# Patient Record
Sex: MALE | Race: WHITE | NOT HISPANIC OR LATINO | URBAN - METROPOLITAN AREA
[De-identification: names, ages, dates, MRNs, and addresses within clinical notes are randomized per-mention and may not be internally consistent; named-entity substitution may affect disease eponyms.]

---

## 2017-05-15 ENCOUNTER — EMERGENCY (EMERGENCY)
Facility: HOSPITAL | Age: 22
LOS: 1 days | Discharge: HOME | End: 2017-05-15
Attending: EMERGENCY MEDICINE
Payer: MEDICAID

## 2017-05-15 VITALS
TEMPERATURE: 98 F | HEIGHT: 68 IN | SYSTOLIC BLOOD PRESSURE: 132 MMHG | OXYGEN SATURATION: 98 % | HEART RATE: 72 BPM | RESPIRATION RATE: 20 BRPM | DIASTOLIC BLOOD PRESSURE: 70 MMHG | WEIGHT: 164.91 LBS

## 2017-05-15 VITALS
HEART RATE: 68 BPM | OXYGEN SATURATION: 98 % | DIASTOLIC BLOOD PRESSURE: 74 MMHG | RESPIRATION RATE: 20 BRPM | SYSTOLIC BLOOD PRESSURE: 122 MMHG

## 2017-05-15 DIAGNOSIS — R07.89 OTHER CHEST PAIN: ICD-10-CM

## 2017-05-15 DIAGNOSIS — R10.11 RIGHT UPPER QUADRANT PAIN: ICD-10-CM

## 2017-05-15 LAB
ALBUMIN SERPL ELPH-MCNC: 4.6 G/DL — SIGNIFICANT CHANGE UP (ref 3.3–5.2)
ALP SERPL-CCNC: 83 U/L — SIGNIFICANT CHANGE UP (ref 40–120)
ALT FLD-CCNC: 7 U/L — SIGNIFICANT CHANGE UP
ANION GAP SERPL CALC-SCNC: 14 MMOL/L — SIGNIFICANT CHANGE UP (ref 5–17)
AST SERPL-CCNC: 18 U/L — SIGNIFICANT CHANGE UP
BASOPHILS # BLD AUTO: 0 K/UL — SIGNIFICANT CHANGE UP (ref 0–0.2)
BASOPHILS NFR BLD AUTO: 0.2 % — SIGNIFICANT CHANGE UP (ref 0–2)
BILIRUB SERPL-MCNC: 0.5 MG/DL — SIGNIFICANT CHANGE UP (ref 0.4–2)
BUN SERPL-MCNC: 14 MG/DL — SIGNIFICANT CHANGE UP (ref 8–20)
CALCIUM SERPL-MCNC: 9.8 MG/DL — SIGNIFICANT CHANGE UP (ref 8.6–10.2)
CHLORIDE SERPL-SCNC: 100 MMOL/L — SIGNIFICANT CHANGE UP (ref 98–107)
CO2 SERPL-SCNC: 26 MMOL/L — SIGNIFICANT CHANGE UP (ref 22–29)
CREAT SERPL-MCNC: 1.01 MG/DL — SIGNIFICANT CHANGE UP (ref 0.5–1.3)
EOSINOPHIL # BLD AUTO: 0.3 K/UL — SIGNIFICANT CHANGE UP (ref 0–0.5)
EOSINOPHIL NFR BLD AUTO: 3.3 % — SIGNIFICANT CHANGE UP (ref 0–5)
GLUCOSE SERPL-MCNC: 98 MG/DL — SIGNIFICANT CHANGE UP (ref 70–115)
HCT VFR BLD CALC: 49.3 % — SIGNIFICANT CHANGE UP (ref 42–52)
HGB BLD-MCNC: 16.7 G/DL — SIGNIFICANT CHANGE UP (ref 14–18)
LIDOCAIN IGE QN: 41 U/L — SIGNIFICANT CHANGE UP (ref 22–51)
LYMPHOCYTES # BLD AUTO: 2.1 K/UL — SIGNIFICANT CHANGE UP (ref 1–4.8)
LYMPHOCYTES # BLD AUTO: 24.8 % — SIGNIFICANT CHANGE UP (ref 20–55)
MCHC RBC-ENTMCNC: 29.3 PG — SIGNIFICANT CHANGE UP (ref 27–31)
MCHC RBC-ENTMCNC: 33.9 G/DL — SIGNIFICANT CHANGE UP (ref 32–36)
MCV RBC AUTO: 86.6 FL — SIGNIFICANT CHANGE UP (ref 80–94)
MONOCYTES # BLD AUTO: 0.4 K/UL — SIGNIFICANT CHANGE UP (ref 0–0.8)
MONOCYTES NFR BLD AUTO: 5 % — SIGNIFICANT CHANGE UP (ref 3–10)
NEUTROPHILS # BLD AUTO: 5.6 K/UL — SIGNIFICANT CHANGE UP (ref 1.8–8)
NEUTROPHILS NFR BLD AUTO: 66.6 % — SIGNIFICANT CHANGE UP (ref 37–73)
PLATELET # BLD AUTO: 197 K/UL — SIGNIFICANT CHANGE UP (ref 150–400)
POTASSIUM SERPL-MCNC: 4.2 MMOL/L — SIGNIFICANT CHANGE UP (ref 3.5–5.3)
POTASSIUM SERPL-SCNC: 4.2 MMOL/L — SIGNIFICANT CHANGE UP (ref 3.5–5.3)
PROT SERPL-MCNC: 7.6 G/DL — SIGNIFICANT CHANGE UP (ref 6.6–8.7)
RBC # BLD: 5.69 M/UL — SIGNIFICANT CHANGE UP (ref 4.6–6.2)
RBC # FLD: 13.3 % — SIGNIFICANT CHANGE UP (ref 11–15.6)
SODIUM SERPL-SCNC: 140 MMOL/L — SIGNIFICANT CHANGE UP (ref 135–145)
WBC # BLD: 8.4 K/UL — SIGNIFICANT CHANGE UP (ref 4.8–10.8)
WBC # FLD AUTO: 8.4 K/UL — SIGNIFICANT CHANGE UP (ref 4.8–10.8)

## 2017-05-15 PROCEDURE — 99284 EMERGENCY DEPT VISIT MOD MDM: CPT

## 2017-05-15 PROCEDURE — 83690 ASSAY OF LIPASE: CPT

## 2017-05-15 PROCEDURE — 85027 COMPLETE CBC AUTOMATED: CPT

## 2017-05-15 PROCEDURE — 80053 COMPREHEN METABOLIC PANEL: CPT

## 2017-05-15 PROCEDURE — 76705 ECHO EXAM OF ABDOMEN: CPT

## 2017-05-15 PROCEDURE — 76705 ECHO EXAM OF ABDOMEN: CPT | Mod: 26

## 2017-05-15 PROCEDURE — 99284 EMERGENCY DEPT VISIT MOD MDM: CPT | Mod: 25

## 2017-05-15 NOTE — ED STATDOCS - NS ED MD SCRIBE ATTENDING SCRIBE SECTIONS
PHYSICAL EXAM/HISTORY OF PRESENT ILLNESS/VITAL SIGNS( Pullset)/PAST MEDICAL/SURGICAL/SOCIAL HISTORY/DISPOSITION/HIV/REVIEW OF SYSTEMS

## 2017-05-15 NOTE — ED STATDOCS - ATTENDING CONTRIBUTION TO CARE
I, Marcelino Camargo, performed the initial face to face bedside interview with this patient regarding history of present illness, review of symptoms and relevant past medical, social and family history.  I completed an independent physical examination.  I was the initial provider who evaluated this patient. I have signed out the follow up of any pending tests (i.e. labs, radiological studies) to the ACP.  I have communicated the patient’s plan of care and disposition with the ACP.  The history, relevant review of systems, past medical and surgical history, medical decision making, and physical examination was documented by the scribe in my presence and I attest to the accuracy of the documentation.

## 2017-05-15 NOTE — ED ADULT NURSE REASSESSMENT NOTE - NS ED NURSE REASSESS COMMENT FT1
Patient called to intake room 2 at 1900 and states that he does not want to be seen in ED because he no longer wants to wait.  Patient is awake and alert x3.  Patient made aware the risk of leaving including possible permanent disability or even death. Patient called to intake room 2 at 1900 and states that he does not want to be seen in ED because he no longer wants to wait.  Patient is awake and alert x3.  Patient made aware the risk of leaving including possible permanent disability or even death.  Patient still in ED at 1918 and states he will stay in ED and be seen.
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
## 2017-05-15 NOTE — ED ADULT TRIAGE NOTE - CHIEF COMPLAINT QUOTE
Patient arrived to ED today with c/o chest pressure, SOB, and abdominal pain.  Patient denies medical history.

## 2017-11-15 ENCOUNTER — EMERGENCY (EMERGENCY)
Facility: HOSPITAL | Age: 22
LOS: 1 days | Discharge: HOME | End: 2017-11-15
Attending: EMERGENCY MEDICINE
Payer: MEDICAID

## 2017-11-15 VITALS
HEIGHT: 69 IN | OXYGEN SATURATION: 100 % | RESPIRATION RATE: 20 BRPM | DIASTOLIC BLOOD PRESSURE: 80 MMHG | HEART RATE: 80 BPM | SYSTOLIC BLOOD PRESSURE: 133 MMHG | TEMPERATURE: 98 F | WEIGHT: 164.91 LBS

## 2017-11-15 LAB
APPEARANCE UR: CLEAR — SIGNIFICANT CHANGE UP
BACTERIA # UR AUTO: ABNORMAL
BILIRUB UR-MCNC: NEGATIVE — SIGNIFICANT CHANGE UP
COLOR SPEC: YELLOW — SIGNIFICANT CHANGE UP
DIFF PNL FLD: NEGATIVE — SIGNIFICANT CHANGE UP
EPI CELLS # UR: SIGNIFICANT CHANGE UP
GLUCOSE UR QL: NEGATIVE MG/DL — SIGNIFICANT CHANGE UP
KETONES UR-MCNC: NEGATIVE — SIGNIFICANT CHANGE UP
LEUKOCYTE ESTERASE UR-ACNC: ABNORMAL
NITRITE UR-MCNC: NEGATIVE — SIGNIFICANT CHANGE UP
PH UR: 6 — SIGNIFICANT CHANGE UP (ref 5–8)
PROT UR-MCNC: 15 MG/DL
RBC CASTS # UR COMP ASSIST: SIGNIFICANT CHANGE UP /HPF (ref 0–4)
SP GR SPEC: 1.02 — SIGNIFICANT CHANGE UP (ref 1.01–1.02)
UROBILINOGEN FLD QL: NEGATIVE MG/DL — SIGNIFICANT CHANGE UP
WBC UR QL: SIGNIFICANT CHANGE UP

## 2017-11-15 PROCEDURE — 96372 THER/PROPH/DIAG INJ SC/IM: CPT

## 2017-11-15 PROCEDURE — 99283 EMERGENCY DEPT VISIT LOW MDM: CPT | Mod: 25

## 2017-11-15 PROCEDURE — 81001 URINALYSIS AUTO W/SCOPE: CPT

## 2017-11-15 PROCEDURE — 99284 EMERGENCY DEPT VISIT MOD MDM: CPT

## 2017-11-15 RX ORDER — CEFTRIAXONE 500 MG/1
250 INJECTION, POWDER, FOR SOLUTION INTRAMUSCULAR; INTRAVENOUS ONCE
Qty: 0 | Refills: 0 | Status: COMPLETED | OUTPATIENT
Start: 2017-11-15 | End: 2017-11-15

## 2017-11-15 RX ORDER — AZITHROMYCIN 500 MG/1
1000 TABLET, FILM COATED ORAL ONCE
Qty: 0 | Refills: 0 | Status: COMPLETED | OUTPATIENT
Start: 2017-11-15 | End: 2017-11-15

## 2017-11-15 RX ADMIN — CEFTRIAXONE 250 MILLIGRAM(S): 500 INJECTION, POWDER, FOR SOLUTION INTRAMUSCULAR; INTRAVENOUS at 23:30

## 2017-11-15 RX ADMIN — AZITHROMYCIN 1000 MILLIGRAM(S): 500 TABLET, FILM COATED ORAL at 23:30

## 2017-11-15 NOTE — ED STATDOCS - OBJECTIVE STATEMENT
23 y/o M pt with no pertinent PMHx presents to the ED c/o lower abdominal pain, groin pain and burning on urination with yellow penile discharge that onset 3 days ago. Currently sexually active, unprotected sex with one partner. Has had chlamydia in the past and he was treated successfully. Non smoker, non drinker, no illicit drug use. No SHx. Denies testicular pain, rash, testicular swelling, fever, chills, recent  heavy lifting, sick contacts, recent travel, chest pain, SOB, n/v/d/c, abdominal pain, SOB, cough/cold symptoms or any other complaints. NKDA.

## 2017-11-15 NOTE — ED STATDOCS - MEDICAL DECISION MAKING DETAILS
most likely gc/chlamydia given penile discharge vs uti will fu UA tx for gc/chlamydia--dc most likely gc/chlamydia given penile discharge vs uti will fu UA tx for gc/chlamydia; unlikely appy or other abdominal pathology --abd bening no hernia on exam--dc

## 2017-11-15 NOTE — ED STATDOCS - ATTENDING CONTRIBUTION TO CARE
I, Janell Duong, performed the initial face to face bedside interview with this patient regarding history of present illness, review of symptoms and relevant past medical, social and family history.  I completed an independent physical examination.  I was the initial provider who evaluated this patient. I have signed out the follow up of any pending tests (i.e. labs, radiological studies) to the ACP.  I have communicated the patient’s plan of care and disposition with the ACP.

## 2017-11-16 ENCOUNTER — EMERGENCY (EMERGENCY)
Facility: HOSPITAL | Age: 22
LOS: 1 days | Discharge: HOME | End: 2017-11-16
Attending: EMERGENCY MEDICINE
Payer: MEDICAID

## 2017-11-16 VITALS
WEIGHT: 164.91 LBS | RESPIRATION RATE: 18 BRPM | HEIGHT: 69 IN | TEMPERATURE: 98 F | OXYGEN SATURATION: 100 % | DIASTOLIC BLOOD PRESSURE: 72 MMHG | HEART RATE: 70 BPM | SYSTOLIC BLOOD PRESSURE: 121 MMHG

## 2017-11-16 PROCEDURE — 99283 EMERGENCY DEPT VISIT LOW MDM: CPT

## 2017-11-16 RX ADMIN — Medication 100 MILLIGRAM(S): at 22:02

## 2017-11-16 NOTE — ED STATDOCS - OBJECTIVE STATEMENT
23 y/o M pt with no PMHx presents to the ED c/o abdominal pain x3 days. Explains that he came to the ED for the same issue 1 day ago and was treated with shots and pills. Reports unprotected sex with women, last sexual encounter a few weeks ago. Now complaining of pressure in his groin. Denies n/v/d/c, pain in rectum, pain when passing stool, chest pain, sob, fever, chills, blurred vision, pain when ejaculating, testicular pain, hematuria or any other complaints. NKDA. No SHx, No recent travel, sick contacts. 23 y/o M pt with no PMHx presents to the ED c/o abdominal pain x3 days. Describes pain as in and around groin area. Explains that he came to the ED for the same issue 1 day ago and was treated with shots and pills. Reports unprotected sex with women, last sexual encounter a few weeks ago. Now complaining of pressure in his groin. Denies n/v/d/c, pain in rectum, pain when passing stool, chest pain, sob, fever, chills, blurred vision, pain when ejaculating, testicular pain, hematuria or any other complaints. NKDA. No SHx, No recent travel, sick contacts.

## 2017-11-17 LAB
C TRACH RRNA SPEC QL NAA+PROBE: SIGNIFICANT CHANGE UP
N GONORRHOEA RRNA SPEC QL NAA+PROBE: SIGNIFICANT CHANGE UP
SPECIMEN SOURCE: SIGNIFICANT CHANGE UP

## 2017-12-03 ENCOUNTER — EMERGENCY (EMERGENCY)
Facility: HOSPITAL | Age: 22
LOS: 1 days | Discharge: HOME | End: 2017-12-03
Attending: EMERGENCY MEDICINE
Payer: MEDICAID

## 2017-12-03 VITALS
SYSTOLIC BLOOD PRESSURE: 148 MMHG | RESPIRATION RATE: 18 BRPM | DIASTOLIC BLOOD PRESSURE: 82 MMHG | OXYGEN SATURATION: 98 % | HEART RATE: 79 BPM | TEMPERATURE: 98 F

## 2017-12-03 VITALS — HEIGHT: 69 IN | WEIGHT: 164.91 LBS

## 2017-12-03 PROCEDURE — 99282 EMERGENCY DEPT VISIT SF MDM: CPT

## 2017-12-03 RX ORDER — METRONIDAZOLE 500 MG
1 TABLET ORAL
Qty: 14 | Refills: 0 | OUTPATIENT
Start: 2017-12-03 | End: 2017-12-10

## 2017-12-03 NOTE — ED STATDOCS - NS_ ATTENDINGSCRIBEDETAILS _ED_A_ED_FT
I, Mani Bass, performed the initial face to face bedside interview with this patient regarding history of present illness, review of symptoms and relevant past medical, social and family history.  I completed an independent physical examination.  I was the initial provider who evaluated this patient. I have signed out the follow up of any pending tests (i.e. labs, radiological studies) to the ACP.  I have communicated the patient’s plan of care and disposition with the ACP.  The history, relevant review of systems, past medical and surgical history, medical decision making, and physical examination was documented by the scribe in my presence and I attest to the accuracy of the documentation.

## 2017-12-03 NOTE — ED STATDOCS - OBJECTIVE STATEMENT
23 y/o M pt with PMHx of clamydia presents to ED c/o yellow and white penile discharge x2 days. Pt reports he was seen in the ED 2 weeks ago for similar sx and was tested for STDs, which was negative. He was d/c'd on Doxycyline which provided relief. However, he lost the prescription somewhere after taking it for 3 days and sx have not subsided. Pt did not f/u with PMD. However, pt went to clinic today where they did a culture of the discharge. He has not been sexually active since. Pt denies fever, chills, CP, SOB, nausea, vomiting, abd pain, pelvic pain, testicular swelling/lesions, dysuria, hematuria, and urinary frequency/urgency/hesitancy.

## 2017-12-03 NOTE — ED ADULT NURSE NOTE - OBJECTIVE STATEMENT
23y/o male c/o abd pain and left groin pain. Pt was seen here for similar symptoms, was given PO abx and admits to not finishing dose. Pt states to have painful, frequent urination and yellow/white discharge. Pt AOx3, resp even unlabored, denies N/V, fever or chills

## 2017-12-06 ENCOUNTER — TRANSCRIPTION ENCOUNTER (OUTPATIENT)
Age: 22
End: 2017-12-06

## 2017-12-06 PROBLEM — Z00.00 ENCOUNTER FOR PREVENTIVE HEALTH EXAMINATION: Status: ACTIVE | Noted: 2017-12-06

## 2017-12-08 ENCOUNTER — APPOINTMENT (OUTPATIENT)
Dept: UROLOGY | Facility: CLINIC | Age: 22
End: 2017-12-08
Payer: MEDICAID

## 2017-12-08 VITALS
SYSTOLIC BLOOD PRESSURE: 135 MMHG | HEIGHT: 68 IN | WEIGHT: 145 LBS | TEMPERATURE: 98 F | BODY MASS INDEX: 21.98 KG/M2 | OXYGEN SATURATION: 98 % | DIASTOLIC BLOOD PRESSURE: 82 MMHG

## 2017-12-08 LAB
BILIRUB UR QL STRIP: NORMAL
GLUCOSE UR-MCNC: NORMAL
HCG UR QL: NORMAL EU/DL
HGB UR QL STRIP.AUTO: NORMAL
KETONES UR-MCNC: NORMAL
LEUKOCYTE ESTERASE UR QL STRIP: NORMAL
NITRITE UR QL STRIP: NORMAL
PH UR STRIP: 7
PROT UR STRIP-MCNC: NORMAL
SP GR UR STRIP: 1025

## 2017-12-08 PROCEDURE — 81003 URINALYSIS AUTO W/O SCOPE: CPT | Mod: QW

## 2017-12-08 PROCEDURE — 99203 OFFICE O/P NEW LOW 30 MIN: CPT

## 2017-12-11 LAB
C TRACH RRNA SPEC QL NAA+PROBE: NOT DETECTED
N GONORRHOEA RRNA SPEC QL NAA+PROBE: NOT DETECTED
SOURCE AMPLIFICATION: NORMAL

## 2017-12-27 ENCOUNTER — APPOINTMENT (OUTPATIENT)
Dept: UROLOGY | Facility: CLINIC | Age: 22
End: 2017-12-27
Payer: MEDICAID

## 2017-12-27 VITALS
SYSTOLIC BLOOD PRESSURE: 132 MMHG | HEART RATE: 80 BPM | TEMPERATURE: 98 F | OXYGEN SATURATION: 98 % | DIASTOLIC BLOOD PRESSURE: 80 MMHG

## 2017-12-27 PROCEDURE — 99213 OFFICE O/P EST LOW 20 MIN: CPT

## 2017-12-27 PROCEDURE — 81003 URINALYSIS AUTO W/O SCOPE: CPT | Mod: QW

## 2017-12-27 RX ORDER — DOXYCYCLINE 100 MG/1
100 CAPSULE ORAL TWICE DAILY
Qty: 20 | Refills: 0 | Status: COMPLETED | COMMUNITY
Start: 2017-12-08 | End: 2017-12-22

## 2018-01-03 LAB
BILIRUB UR QL STRIP: NEGATIVE
CLARITY UR: CLEAR
COLLECTION METHOD: NORMAL
GLUCOSE UR-MCNC: NEGATIVE
HCG UR QL: 0.2 EU/DL
HGB UR QL STRIP.AUTO: NORMAL
KETONES UR-MCNC: NEGATIVE
LEUKOCYTE ESTERASE UR QL STRIP: NEGATIVE
NITRITE UR QL STRIP: NEGATIVE
PH UR STRIP: 5.5
PROT UR STRIP-MCNC: NEGATIVE
SP GR UR STRIP: >=1.03

## 2018-01-10 ENCOUNTER — APPOINTMENT (OUTPATIENT)
Dept: UROLOGY | Facility: CLINIC | Age: 23
End: 2018-01-10

## 2018-08-28 ENCOUNTER — APPOINTMENT (OUTPATIENT)
Dept: UROLOGY | Facility: CLINIC | Age: 23
End: 2018-08-28
Payer: MEDICAID

## 2018-08-28 PROCEDURE — 99213 OFFICE O/P EST LOW 20 MIN: CPT | Mod: 25

## 2018-08-28 PROCEDURE — 81003 URINALYSIS AUTO W/O SCOPE: CPT | Mod: QW

## 2018-08-28 RX ORDER — PHENAZOPYRIDINE 100 MG/1
100 TABLET, FILM COATED ORAL 3 TIMES DAILY
Qty: 9 | Refills: 0 | Status: ACTIVE | COMMUNITY
Start: 2018-08-28 | End: 1900-01-01

## 2018-08-28 RX ORDER — DOXYCYCLINE HYCLATE 100 MG/1
100 CAPSULE ORAL TWICE DAILY
Qty: 20 | Refills: 0 | Status: ACTIVE | COMMUNITY
Start: 2018-08-28 | End: 1900-01-01

## 2018-08-29 LAB
BILIRUB UR QL STRIP: NORMAL
CLARITY UR: CLEAR
COLLECTION METHOD: NORMAL
GLUCOSE UR-MCNC: NORMAL
HCG UR QL: 0.2 EU/DL
HGB UR QL STRIP.AUTO: NORMAL
KETONES UR-MCNC: NORMAL
LEUKOCYTE ESTERASE UR QL STRIP: NORMAL
NITRITE UR QL STRIP: NORMAL
PH UR STRIP: 6
PROT UR STRIP-MCNC: NORMAL
SP GR UR STRIP: 1.02

## 2018-09-12 ENCOUNTER — APPOINTMENT (OUTPATIENT)
Dept: UROLOGY | Facility: CLINIC | Age: 23
End: 2018-09-12

## 2018-09-18 ENCOUNTER — APPOINTMENT (OUTPATIENT)
Dept: UROLOGY | Facility: CLINIC | Age: 23
End: 2018-09-18
Payer: MEDICAID

## 2018-09-18 VITALS
HEIGHT: 68 IN | TEMPERATURE: 98.6 F | WEIGHT: 175 LBS | BODY MASS INDEX: 26.52 KG/M2 | HEART RATE: 76 BPM | DIASTOLIC BLOOD PRESSURE: 76 MMHG | SYSTOLIC BLOOD PRESSURE: 118 MMHG

## 2018-09-18 LAB
BILIRUB UR QL STRIP: NORMAL
CLARITY UR: NORMAL
COLLECTION METHOD: NORMAL
GLUCOSE UR-MCNC: NORMAL
HCG UR QL: 0.2 EU/DL
HGB UR QL STRIP.AUTO: NORMAL
KETONES UR-MCNC: NORMAL
LEUKOCYTE ESTERASE UR QL STRIP: NORMAL
NITRITE UR QL STRIP: NORMAL
PH UR STRIP: 6
PROT UR STRIP-MCNC: NORMAL
SP GR UR STRIP: 1.02

## 2018-09-18 PROCEDURE — 99213 OFFICE O/P EST LOW 20 MIN: CPT | Mod: 25

## 2018-09-18 PROCEDURE — 81003 URINALYSIS AUTO W/O SCOPE: CPT | Mod: QW

## 2018-09-18 RX ORDER — AZITHROMYCIN 500 MG/1
500 TABLET, FILM COATED ORAL
Qty: 2 | Refills: 0 | Status: ACTIVE | COMMUNITY
Start: 2018-09-18 | End: 1900-01-01

## 2018-09-21 LAB
C TRACH RRNA SPEC QL NAA+PROBE: NOT DETECTED
N GONORRHOEA RRNA SPEC QL NAA+PROBE: NOT DETECTED
SOURCE AMPLIFICATION: NORMAL
SOURCE AMPLIFICATION: NORMAL
T VAGINALIS RRNA SPEC QL NAA+PROBE: NOT DETECTED

## 2018-09-27 ENCOUNTER — LABORATORY RESULT (OUTPATIENT)
Age: 23
End: 2018-09-27

## 2018-09-28 ENCOUNTER — APPOINTMENT (OUTPATIENT)
Dept: UROLOGY | Facility: CLINIC | Age: 23
End: 2018-09-28
Payer: MEDICAID

## 2018-09-28 PROCEDURE — 99213 OFFICE O/P EST LOW 20 MIN: CPT | Mod: 25

## 2018-09-28 PROCEDURE — 81003 URINALYSIS AUTO W/O SCOPE: CPT | Mod: QW

## 2018-10-16 ENCOUNTER — APPOINTMENT (OUTPATIENT)
Dept: UROLOGY | Facility: CLINIC | Age: 23
End: 2018-10-16
Payer: MEDICAID

## 2018-10-16 VITALS — SYSTOLIC BLOOD PRESSURE: 119 MMHG | DIASTOLIC BLOOD PRESSURE: 76 MMHG | HEART RATE: 80 BPM

## 2018-10-16 LAB
BILIRUB UR QL STRIP: NORMAL
BILIRUB UR QL STRIP: NORMAL
CLARITY UR: CLEAR
CLARITY UR: NORMAL
COLLECTION METHOD: NORMAL
COLLECTION METHOD: NORMAL
GLUCOSE UR-MCNC: NORMAL
GLUCOSE UR-MCNC: NORMAL
HCG UR QL: 0.2 EU/DL
HCG UR QL: 0.2 EU/DL
HGB UR QL STRIP.AUTO: NORMAL
HGB UR QL STRIP.AUTO: NORMAL
KETONES UR-MCNC: NORMAL
KETONES UR-MCNC: NORMAL
LEUKOCYTE ESTERASE UR QL STRIP: NORMAL
LEUKOCYTE ESTERASE UR QL STRIP: NORMAL
NITRITE UR QL STRIP: NORMAL
NITRITE UR QL STRIP: NORMAL
PH UR STRIP: 6
PH UR STRIP: 6.5
PROT UR STRIP-MCNC: NORMAL
PROT UR STRIP-MCNC: NORMAL
SP GR UR STRIP: 1.01
SP GR UR STRIP: 1.02

## 2018-10-16 PROCEDURE — 99213 OFFICE O/P EST LOW 20 MIN: CPT | Mod: 25

## 2018-10-16 PROCEDURE — 81003 URINALYSIS AUTO W/O SCOPE: CPT | Mod: QW

## 2018-10-16 RX ORDER — SULFAMETHOXAZOLE AND TRIMETHOPRIM 800; 160 MG/1; MG/1
800-160 TABLET ORAL
Qty: 28 | Refills: 0 | Status: ACTIVE | COMMUNITY
Start: 2018-09-28 | End: 1900-01-01

## 2018-10-21 ENCOUNTER — EMERGENCY (EMERGENCY)
Facility: HOSPITAL | Age: 23
LOS: 1 days | Discharge: HOME | End: 2018-10-21
Attending: EMERGENCY MEDICINE
Payer: SELF-PAY

## 2018-10-21 VITALS — HEIGHT: 69 IN | WEIGHT: 175.05 LBS

## 2018-10-21 VITALS
TEMPERATURE: 98 F | SYSTOLIC BLOOD PRESSURE: 137 MMHG | RESPIRATION RATE: 17 BRPM | DIASTOLIC BLOOD PRESSURE: 81 MMHG | HEART RATE: 79 BPM | OXYGEN SATURATION: 97 %

## 2018-10-21 PROCEDURE — 99283 EMERGENCY DEPT VISIT LOW MDM: CPT

## 2018-10-21 NOTE — ED STATDOCS - SECONDARY DIAGNOSIS.
Contusion of head, unspecified part of head, initial encounter Contusion of left hip, initial encounter
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
## 2018-11-13 ENCOUNTER — APPOINTMENT (OUTPATIENT)
Dept: UROLOGY | Facility: CLINIC | Age: 23
End: 2018-11-13

## 2018-11-16 ENCOUNTER — APPOINTMENT (OUTPATIENT)
Dept: UROLOGY | Facility: CLINIC | Age: 23
End: 2018-11-16
Payer: SELF-PAY

## 2018-11-16 VITALS
SYSTOLIC BLOOD PRESSURE: 124 MMHG | HEART RATE: 86 BPM | BODY MASS INDEX: 25.76 KG/M2 | WEIGHT: 170 LBS | DIASTOLIC BLOOD PRESSURE: 85 MMHG | HEIGHT: 68 IN

## 2018-11-16 DIAGNOSIS — N34.1 NONSPECIFIC URETHRITIS: ICD-10-CM

## 2018-11-16 DIAGNOSIS — N41.1 CHRONIC PROSTATITIS: ICD-10-CM

## 2018-11-16 DIAGNOSIS — N34.2 OTHER URETHRITIS: ICD-10-CM

## 2018-11-16 PROCEDURE — 99213 OFFICE O/P EST LOW 20 MIN: CPT | Mod: 25

## 2018-11-16 PROCEDURE — 81003 URINALYSIS AUTO W/O SCOPE: CPT | Mod: QW

## 2018-11-17 ENCOUNTER — LABORATORY RESULT (OUTPATIENT)
Age: 23
End: 2018-11-17

## 2018-11-21 LAB
BILIRUB UR QL STRIP: NORMAL
CLARITY UR: CLEAR
COLLECTION METHOD: NORMAL
GLUCOSE UR-MCNC: NORMAL
HCG UR QL: 0.2 EU/DL
HGB UR QL STRIP.AUTO: NORMAL
KETONES UR-MCNC: NORMAL
LEUKOCYTE ESTERASE UR QL STRIP: NORMAL
NITRITE UR QL STRIP: NORMAL
PH UR STRIP: 7
PROT UR STRIP-MCNC: NORMAL
SP GR UR STRIP: 1.02

## 2018-11-30 ENCOUNTER — APPOINTMENT (OUTPATIENT)
Dept: UROLOGY | Facility: CLINIC | Age: 23
End: 2018-11-30
Payer: SELF-PAY

## 2018-11-30 VITALS
WEIGHT: 170 LBS | HEIGHT: 68 IN | BODY MASS INDEX: 25.76 KG/M2 | TEMPERATURE: 98 F | HEART RATE: 78 BPM | SYSTOLIC BLOOD PRESSURE: 114 MMHG | DIASTOLIC BLOOD PRESSURE: 78 MMHG

## 2018-11-30 PROCEDURE — 81003 URINALYSIS AUTO W/O SCOPE: CPT | Mod: QW

## 2018-11-30 PROCEDURE — 52000 CYSTOURETHROSCOPY: CPT

## 2018-12-21 LAB
BILIRUB UR QL STRIP: NORMAL
CLARITY UR: CLEAR
COLLECTION METHOD: NORMAL
CORE LAB FLUID CYTOLOGY: NORMAL
GLUCOSE UR-MCNC: NORMAL
HCG UR QL: 0.2 EU/DL
HGB UR QL STRIP.AUTO: NORMAL
KETONES UR-MCNC: NORMAL
LEUKOCYTE ESTERASE UR QL STRIP: NORMAL
NITRITE UR QL STRIP: NORMAL
PH UR STRIP: 6.5
PROT UR STRIP-MCNC: NORMAL
SP GR UR STRIP: >=1.03

## 2019-01-04 ENCOUNTER — APPOINTMENT (OUTPATIENT)
Dept: UROLOGY | Facility: CLINIC | Age: 24
End: 2019-01-04

## 2019-10-24 ENCOUNTER — TRANSCRIPTION ENCOUNTER (OUTPATIENT)
Age: 24
End: 2019-10-24

## 2019-11-01 ENCOUNTER — TRANSCRIPTION ENCOUNTER (OUTPATIENT)
Age: 24
End: 2019-11-01

## 2020-07-25 ENCOUNTER — EMERGENCY (EMERGENCY)
Facility: HOSPITAL | Age: 25
LOS: 1 days | Discharge: HOME | End: 2020-07-25
Attending: EMERGENCY MEDICINE
Payer: SELF-PAY

## 2020-07-25 VITALS
HEART RATE: 84 BPM | WEIGHT: 179.9 LBS | OXYGEN SATURATION: 99 % | DIASTOLIC BLOOD PRESSURE: 79 MMHG | TEMPERATURE: 98 F | RESPIRATION RATE: 18 BRPM | HEIGHT: 69 IN | SYSTOLIC BLOOD PRESSURE: 120 MMHG

## 2020-07-25 PROCEDURE — 99282 EMERGENCY DEPT VISIT SF MDM: CPT

## 2020-07-25 NOTE — ED PROVIDER NOTE - CARE PROVIDER_API CALL
Cici Baumann  GASTROENTEROLOGY  [address] [place] [address]  Phone: [phone]  Fax: [phone]  Follow Up Time:

## 2020-07-25 NOTE — ED PROVIDER NOTE - PATIENT PORTAL LINK FT
You can access the FollowMyHealth Patient Portal offered by [facility] by registering at the following website: [email or web address]. By joining [employer]’s FollowMyHealth portal, you will also be able to view your health information using other applications (apps) compatible with our system.

## 2021-11-18 NOTE — ED STATDOCS - SCRIBE NAME
-- DO NOT REPLY / DO NOT REPLY ALL --  -- Message is from the [facility]--    General Patient Message      Reason for Call: Patient states  prescribed her sildenafil (REVATIO) 20 MG tablet on 11/15/21 and she states she was stold if there is a issue reach back out. Patient states the [facility] states they need prior authorization from the insurance for the medication. Please follow up and assist.     Caller Information       Type Contact Phone    11/18/2021 01:54 PM CST Phone (Incoming) Samaria Day (Self) [phone] (M)          Alternative phone number: None     Turnaround time given to caller:   \"This message will be sent to [state Provider's name]. The clinical team will fulfill your request as soon as they review your message.\"    
Almita Correa

## 2022-01-03 ENCOUNTER — EMERGENCY (EMERGENCY)
Facility: HOSPITAL | Age: 27
LOS: 1 days | Discharge: HOME | End: 2022-01-03
Attending: EMERGENCY MEDICINE
Payer: MEDICAID

## 2022-01-03 VITALS
WEIGHT: 190.04 LBS | RESPIRATION RATE: 18 BRPM | DIASTOLIC BLOOD PRESSURE: 80 MMHG | TEMPERATURE: 97 F | HEIGHT: 69 IN | HEART RATE: 80 BPM | OXYGEN SATURATION: 99 % | SYSTOLIC BLOOD PRESSURE: 123 MMHG

## 2022-01-03 PROBLEM — Z78.9 OTHER SPECIFIED HEALTH STATUS: Chronic | Status: ACTIVE | Noted: 2020-07-25

## 2022-01-03 LAB
ALBUMIN SERPL ELPH-MCNC: 4.7 G/DL — SIGNIFICANT CHANGE UP (ref 3.3–5.2)
ALP SERPL-CCNC: 81 U/L — SIGNIFICANT CHANGE UP (ref 40–120)
ALT FLD-CCNC: 9 U/L — SIGNIFICANT CHANGE UP
ANION GAP SERPL CALC-SCNC: 10 MMOL/L — SIGNIFICANT CHANGE UP (ref 5–17)
AST SERPL-CCNC: 23 U/L — SIGNIFICANT CHANGE UP
BILIRUB SERPL-MCNC: 0.5 MG/DL — SIGNIFICANT CHANGE UP (ref 0.4–2)
BUN SERPL-MCNC: 25.4 MG/DL — HIGH (ref 8–20)
CALCIUM SERPL-MCNC: 9.7 MG/DL — SIGNIFICANT CHANGE UP (ref 8.6–10.2)
CHLORIDE SERPL-SCNC: 101 MMOL/L — SIGNIFICANT CHANGE UP (ref 98–107)
CO2 SERPL-SCNC: 26 MMOL/L — SIGNIFICANT CHANGE UP (ref 22–29)
CREAT SERPL-MCNC: 0.9 MG/DL — SIGNIFICANT CHANGE UP (ref 0.5–1.3)
GLUCOSE SERPL-MCNC: 102 MG/DL — HIGH (ref 70–99)
HCT VFR BLD CALC: 49.4 % — SIGNIFICANT CHANGE UP (ref 39–50)
HGB BLD-MCNC: 16.8 G/DL — SIGNIFICANT CHANGE UP (ref 13–17)
MCHC RBC-ENTMCNC: 28.8 PG — SIGNIFICANT CHANGE UP (ref 27–34)
MCHC RBC-ENTMCNC: 34 GM/DL — SIGNIFICANT CHANGE UP (ref 32–36)
MCV RBC AUTO: 84.6 FL — SIGNIFICANT CHANGE UP (ref 80–100)
PLATELET # BLD AUTO: 232 K/UL — SIGNIFICANT CHANGE UP (ref 150–400)
POTASSIUM SERPL-MCNC: 4.9 MMOL/L — SIGNIFICANT CHANGE UP (ref 3.5–5.3)
POTASSIUM SERPL-SCNC: 4.9 MMOL/L — SIGNIFICANT CHANGE UP (ref 3.5–5.3)
PROT SERPL-MCNC: 7.5 G/DL — SIGNIFICANT CHANGE UP (ref 6.6–8.7)
RBC # BLD: 5.84 M/UL — HIGH (ref 4.2–5.8)
RBC # FLD: 12.6 % — SIGNIFICANT CHANGE UP (ref 10.3–14.5)
SODIUM SERPL-SCNC: 137 MMOL/L — SIGNIFICANT CHANGE UP (ref 135–145)
WBC # BLD: 6.47 K/UL — SIGNIFICANT CHANGE UP (ref 3.8–10.5)
WBC # FLD AUTO: 6.47 K/UL — SIGNIFICANT CHANGE UP (ref 3.8–10.5)

## 2022-01-03 PROCEDURE — 99284 EMERGENCY DEPT VISIT MOD MDM: CPT | Mod: 25

## 2022-01-03 PROCEDURE — 80053 COMPREHEN METABOLIC PANEL: CPT

## 2022-01-03 PROCEDURE — 36415 COLL VENOUS BLD VENIPUNCTURE: CPT

## 2022-01-03 PROCEDURE — 85027 COMPLETE CBC AUTOMATED: CPT

## 2022-01-03 PROCEDURE — 99284 EMERGENCY DEPT VISIT MOD MDM: CPT

## 2022-01-03 PROCEDURE — 96374 THER/PROPH/DIAG INJ IV PUSH: CPT

## 2022-01-03 PROCEDURE — 96375 TX/PRO/DX INJ NEW DRUG ADDON: CPT

## 2022-01-03 PROCEDURE — 83690 ASSAY OF LIPASE: CPT

## 2022-01-03 RX ORDER — FAMOTIDINE 10 MG/ML
1 INJECTION INTRAVENOUS
Qty: 7 | Refills: 0
Start: 2022-01-03 | End: 2022-01-09

## 2022-01-03 RX ORDER — FAMOTIDINE 10 MG/ML
20 INJECTION INTRAVENOUS ONCE
Refills: 0 | Status: COMPLETED | OUTPATIENT
Start: 2022-01-03 | End: 2022-01-03

## 2022-01-03 RX ORDER — ONDANSETRON 8 MG/1
4 TABLET, FILM COATED ORAL ONCE
Refills: 0 | Status: COMPLETED | OUTPATIENT
Start: 2022-01-03 | End: 2022-01-03

## 2022-01-03 RX ADMIN — ONDANSETRON 4 MILLIGRAM(S): 8 TABLET, FILM COATED ORAL at 12:53

## 2022-01-03 RX ADMIN — FAMOTIDINE 20 MILLIGRAM(S): 10 INJECTION INTRAVENOUS at 11:29

## 2022-01-03 NOTE — ED STATDOCS - OBJECTIVE STATEMENT
25 y/o male with no PMHx, presents to the ED c/o nausea and vomiting today. Pt states he had one episode of vomiting where he noticed streaks of blood. Pt also notes this morning he had a headache that went away on its own. States he drank beers last night. Reports hx of possible gastritis, and states he was prescribed antibiotics from the clinic. Occasional ETOH use. Denies abdominal pain. Currently not taking medication.

## 2022-01-03 NOTE — ED ADULT TRIAGE NOTE - HEIGHT IN INCHES
9 Occupational Therapy     Referred by: Maco Monzon MD; Medical Diagnosis (from order):    Diagnosis Information      Diagnosis    726.0 (ICD-9-CM) - M75.01 (ICD-10-CM) - Adhesive capsulitis of right shoulder                Daily Treatment Note    Visit: 4    SUBJECTIVE                                                                                                             Patient reports Continued difficulty with sleeping on back and on either side.  \"It feels loose and not held in place.\"  \"I need strengthening exercises so I can get better.\"  She reports the kinesiotaping felt good and was a good reminder to keep her shoulder back in a better position.  Overall, she reports \"the pain does not stay as long as it used to.\"  Functional Change: Pain is rated at 3/10 with her normal routine in the morning but at times does increase to 6/10 depending on what she is doing ie.  When she rolls on her right side or left side at night.    Pain / Symptoms:  Pain rating (out of 10): Current: 3     OBJECTIVE                                                                                                                     Range of Motion (ROM)   (degrees unless noted; active unless noted; norms in ( ); negative=lacking to 0, positive=beyond 0)   Shoulder:     - Internal Rotation at 45°:         • Right: 40    - External Rotation at 45°:         • Right: 62 pain      TREATMENT                                                                                                                  Therapeutic Exercise:  Issued and completed HEP- see details below  Isometric ER/IR @ 0 shoulder abduction and shoulder flexion with 5 second hold x 10 added to home exercise program and completed in treatment.  OT spent excessive time explaining that I prefer her to not use her small weights at home for strengthening exercises..Temperature taken orally. Please hold off for now and we will start isometric which was explained to  her.    Education on avoiding leaning forward and reaching into shoulder flexion, shoulder combined ER and abduction and excessive shoulder extension to avoid strain to shoulder and potential for subluxation of shoulder.      Manual Therapy:  Soft tissue mobilization shoulder and proximal upper arm, pectoralis major/minor, and Biceps to increase soft tissue extensibility and increase blood flow to tissues.  Glenohumeral  joint mobilization posterior and posterior/inferior grade 2  Myofacial release pectoralis major  pectoralis major stretch supine   Stretch to Posterior inferior GH capsule @ 75 abduction supine with moderate to maximum tightness noted.  After stretching, it did reduce.    kinesiotaping applied to biceps with solid 2 inch anchor at distal biceps just proximal to volar elbow crease with split tails wrapping around biceps and anchoring at anterior shoulder with 25% stretch.  Kinesiotaping anterior to posterior GH joint and anterior GH to superior to mid scapula with humeral head manually moved posterior with 50% stretch to be worn during adl's for better proprioceptive awareness of good positioning to minimize impingement at Glenohumeral joint.    Skilled input: verbal instruction/cues and as detailed above    Writer verbally educated and received verbal consent for hand placement, positioning of patient, and techniques to be performed today from patient for hand placement and palpation for techniques and therapist position for techniques as described above and how they are pertinent to the patient's plan of care.    Home Exercise Program: Access Code: [email or web address]  URL: [email or web address]  Date: 04/21/2021  Prepared by: Leena Kamara     Exercises  ·           Supine Shoulder Flexion AAROM with Hands Clasped - 2-3 x daily - 7 x weekly - 1 sets - 10 reps - 5 hold  ·           Circular Shoulder Pendulum with Table Support - 2-3 x daily - 7 x weekly - 10 reps - 1 sets - 1 hold  ·            Flexion-Extension Shoulder Pendulum with Table Support - 2-3 x daily - 7 x weekly - 10 reps - 1 sets - 1 hold  ·           Horizontal Shoulder Pendulum with Table Support - 2-3 x daily - 7 x weekly - 10 reps - 1 sets - 1 hold  ·           Seated Scapular Retraction - 2-3 x daily - 7 x weekly - 10 reps - 1 sets - 1 hold  ·           Seated Shoulder Shrug Circles AROM Backward - 2-3 x daily - 7 x weekly - 10 reps - 1 sets - 1 hold  Access Code: [email or web address]  URL: [email or web address]  Date: 04/21/2021  Prepared by: Leena Kamara    Exercises  · Supine Shoulder Flexion AAROM with Hands Clasped - 2-3 x daily - 7 x weekly - 1 sets - 10 reps - 5 hold  · Seated Scapular Retraction - 2-3 x daily - 7 x weekly - 10 reps - 1 sets - 1 hold  · Seated Shoulder Shrug Circles AROM Backward - 2-3 x daily - 7 x weekly - 10 reps - 1 sets - 1 hold  · Standing Isometric Shoulder External Rotation with Doorway and Towel Roll - 2-3 x daily - 7 x weekly - 1 sets - 10 reps - 5 hold  · Standing Isometric Shoulder Internal Rotation at Doorway - 2-3 x daily - 7 x weekly - 1 sets - 10 reps - 5 hold  · Standing Isometric Shoulder Flexion with Doorway - Arm Bent - 2-3 x daily - 7 x weekly - 1 sets - 10 reps - 5 hold         ASSESSMENT                                                                                                              Pain and weakness are main limiting factors to return to full function. Shoulder mri shows superior labral tear and supraspinatus partial thickness tear.  She knows the results and will discuss with MD at appointment on Friday 5/13/21.  Patient would benefit from continued OT 1x/week for 4 more weeks to address remaining deficits and improve overall functional use.    Patient Education:   Results of above outlined education: Verbalizes understanding, Demonstrates understanding and Needs reinforcement      PLAN                                                                                                                            Suggestions for next session as indicated: Progress per plan of care  Continue trial of kinesiotaping  Soft tissue mobilization shoulder and proximal upper arm, pectoralis major/minor, Biceps and upper trapezius, Biceps  Glenohumeral  joint mobilization posterior and posterior/inferior grade 1-3  Continue trial of ultrasound  Shoulder fascial unwinding combined with PROM in all planes with end range stretch  pectoralis major/minor stretching   Myofacial release pectoralis major/minor  Brachial plexus nerve gliding  Glenohumeral  joint mobilization posterior and posterior/inferior grade 2-3  Stretch Posterior inferior GH capsule supine  Cane exercises for IR and scaption to tolerance  Pass yellow flexbar behind back for combined Internal Rotation and shoulder extension/adduction x 10 each way  Moist hot pack as applicable  Reinforce posture  Scapular protraction supine 0-1# x 10 x 2  Review home exercise program as needed-progress to adding sleeper stretch when tolerated to be on right side, posterior capsule stretching, Bicep stretch            Therapy procedure time and total treatment time can be found documented on the Time Entry flowsheet

## 2022-01-03 NOTE — ED STATDOCS - PATIENT PORTAL LINK FT
You can access the FollowMyHealth Patient Portal offered by [facility] by registering at the following website: [email or web address]. By joining [email or web address]’s FollowMyHealth portal, you will also be able to view your health information using other applications (apps) compatible with our system.

## 2022-01-03 NOTE — ED STATDOCS - CARE PROVIDER_API CALL
French Torres)  Gastroenterology; Internal Medicine  [address] [place] [address], [place] [address]  Phone: [phone]  Fax: [phone]  Follow Up Time:

## 2022-01-03 NOTE — ED STATDOCS - PROGRESS NOTE DETAILS
PT evaluated by intake physician. HPI/PE/ROS as noted above. Will follow up plan per intake physician. reviewed lab work abd soft nontender, improvement in sxs  pt to follow up with GI doctor, will prescribe pepcid to pharamacy

## 2022-01-03 NOTE — ED STATDOCS - CLINICAL SUMMARY MEDICAL DECISION MAKING FREE TEXT BOX
Questionable hx of gastritis with vomiting with tinged blood. Will evaluate for gastritis, pancreatitis. Will treat with Pepcid, lab work, reevaluate.

## 2022-11-25 ENCOUNTER — EMERGENCY (EMERGENCY)
Facility: HOSPITAL | Age: 27
LOS: 1 days | Discharge: HOME | End: 2022-11-25
Attending: EMERGENCY MEDICINE
Payer: COMMERCIAL

## 2022-11-25 VITALS
HEIGHT: 68 IN | DIASTOLIC BLOOD PRESSURE: 90 MMHG | TEMPERATURE: 98 F | SYSTOLIC BLOOD PRESSURE: 144 MMHG | WEIGHT: 175.05 LBS | OXYGEN SATURATION: 98 % | RESPIRATION RATE: 16 BRPM | HEART RATE: 100 BPM

## 2022-11-25 PROCEDURE — 76604 US EXAM CHEST: CPT | Mod: 26

## 2022-11-25 PROCEDURE — 99284 EMERGENCY DEPT VISIT MOD MDM: CPT

## 2022-11-25 PROCEDURE — 76604 US EXAM CHEST: CPT

## 2022-11-25 NOTE — ED PROVIDER NOTE - PHYSICAL EXAMINATION
General-alert and oriented to person place and time, nontoxic appearing, pleasant cooperative, NAD  HEENT-normocephalic, atraumatic, NT to palp, EOMI, PERRLA, no conjunctival injections,   Chest- Nt to palp, no reproducible pain, slightlty tender to palp of the right breast aroudn the nipple, no redness no swelling  Cardio-s1,s2 present, regular rate and rhythm  Resp- talks in full sentences, symmetrical chest rise, CTA bilat, no evidence of wheezes, rhonchi noted  MSK- moves all extremities, able to ambulate without issues  Back- nt to palp of cervical, thoracic, lumbar spine, nt to palp of paraspinal m., No CVA tenderness  Neuro- no focal deficits, sensation intact

## 2022-11-25 NOTE — ED PROVIDER NOTE - PATIENT PORTAL LINK FT
You can access the FollowMyHealth Patient Portal offered by [facility] by registering at the following website: [email or web address]. By joining [employer]’s FollowMyHealth portal, you will also be able to view your health information using other applications (apps) compatible with our system.

## 2022-11-25 NOTE — ED PROVIDER NOTE - ATTENDING APP SHARED VISIT CONTRIBUTION OF CARE
Patient with pain to right breast.  Patient states he has had lumps in breast on and off for years but currently pain is worse and he feels that the lumps are more prominent.  Physical exam positive for what feels like fibrocystic areas of the breast but no discrete mass is appreciated.  No nipple discharge.  Physical exam as documented.  Agree with evaluation and management including ultrasound and likely outpatient follow-up.

## 2022-11-25 NOTE — ED PROVIDER NOTE - PROGRESS NOTE DETAILS
POLO- pt does not want to wait for results, I had a lengthy discussion with patient, and the patient wishes to leave at this time.The patient understands that he/she is leaving against medical advice despite the risk of missing a potential serious diagnosis which may lead to injury, disability and/or death. I discussed with the patient which tests would need to be performed and what type of monitoring would be necessary for the patient as well. I was unable to convice the patient to stay for further work-up.The patient is alert and oriented and demonstrates competence in making medical decisions.

## 2022-11-25 NOTE — ED PROVIDER NOTE - NS ED ATTENDING STATEMENT MOD
This was a shared visit with the KIRAN. I reviewed and verified the documentation and independently performed the documented:

## 2022-11-25 NOTE — ED PROVIDER NOTE - CLINICAL SUMMARY MEDICAL DECISION MAKING FREE TEXT BOX
27-year-old male with no significant medical history presents the ED complaining of lump to the right breast. us and reassess

## 2022-11-25 NOTE — ED PROVIDER NOTE - OBJECTIVE STATEMENT
27-year-old male with no significant medical history presents the ED complaining of lump to the right breast.  Patient notes he has been having this lump on the right breast for approximately a few years.  Patient notes that he has been having more pain and he feels that breast lump is more prominent.  No discharge no change in colors of the skin no fevers no chills no nausea no vomiting.

## 2022-11-25 NOTE — ED ADULT TRIAGE NOTE - CHIEF COMPLAINT QUOTE
Patient ambulated into ED with steady gait, Pt c/o lump to right breast area noticed years ago getting bigger and having pain now

## 2023-01-20 NOTE — ED STATDOCS - NSCAREINITIATED _GEN_ER
Mani Bass(Attending) Suturegard Body: The suture ends were repeatedly re-tightened and re-clamped to achieve the desired tissue expansion.

## 2024-05-25 ENCOUNTER — EMERGENCY (EMERGENCY)
Facility: HOSPITAL | Age: 29
LOS: 1 days | Discharge: HOME | End: 2024-05-25
Attending: EMERGENCY MEDICINE
Payer: COMMERCIAL

## 2024-05-25 PROCEDURE — 99284 EMERGENCY DEPT VISIT MOD MDM: CPT

## 2024-05-26 VITALS
WEIGHT: 200.84 LBS | RESPIRATION RATE: 16 BRPM | DIASTOLIC BLOOD PRESSURE: 85 MMHG | SYSTOLIC BLOOD PRESSURE: 138 MMHG | HEART RATE: 89 BPM | HEIGHT: 68 IN | TEMPERATURE: 98 F | OXYGEN SATURATION: 97 %

## 2024-05-26 PROCEDURE — 94640 AIRWAY INHALATION TREATMENT: CPT

## 2024-05-26 PROCEDURE — 99283 EMERGENCY DEPT VISIT LOW MDM: CPT

## 2024-05-26 RX ORDER — MOMETASONE FUROATE 50 UG/1
2 SPRAY NASAL
Qty: 1 | Refills: 1
Start: 2024-05-26 | End: 2024-07-24

## 2024-05-26 RX ORDER — AZITHROMYCIN 500 MG/1
1 TABLET, FILM COATED ORAL
Qty: 3 | Refills: 0
Start: 2024-05-26 | End: 2024-05-28

## 2024-05-26 RX ORDER — LORATADINE 10 MG/1
10 TABLET ORAL ONCE
Refills: 0 | Status: COMPLETED | OUTPATIENT
Start: 2024-05-26 | End: 2024-05-26

## 2024-05-26 RX ORDER — FEXOFENADINE HCL 30 MG
1 TABLET ORAL
Qty: 30 | Refills: 0
Start: 2024-05-26 | End: 2024-06-24

## 2024-05-26 RX ORDER — FLUTICASONE PROPIONATE 50 MCG
2 SPRAY, SUSPENSION NASAL ONCE
Refills: 0 | Status: COMPLETED | OUTPATIENT
Start: 2024-05-26 | End: 2024-05-26

## 2024-05-26 RX ADMIN — Medication 2 SPRAY(S): at 01:53

## 2024-05-26 RX ADMIN — LORATADINE 10 MILLIGRAM(S): 10 TABLET ORAL at 01:53

## 2024-05-26 RX ADMIN — Medication 20 MILLIGRAM(S): at 01:53

## 2024-05-26 NOTE — ED PROVIDER NOTE - OBJECTIVE STATEMENT
27 yo male no PMHx p/w 1 week of progresive symptoms of facial congestion; cough, watering eyes, and subjective fever; took PO antibx at home; has tried some OTC URI remedies without improvement.

## 2024-05-26 NOTE — ED ADULT TRIAGE NOTE - CHIEF COMPLAINT QUOTE
pt arrived with seasonal allergies, sinus pain, body aches and sore throat for last few days endorses taking antibiotics PTA

## 2024-05-26 NOTE — ED PROVIDER NOTE - PATIENT PORTAL LINK FT
You can access the FollowMyHealth Patient Portal offered by [facility] by registering at the following website: [email or web address]. By joining [employer]’s FollowMyHealth portal, you will also be able to view your health information using other applications (apps) compatible with our system.

## 2024-05-26 NOTE — ED PROVIDER NOTE - CLINICAL SUMMARY MEDICAL DECISION MAKING FREE TEXT BOX
URI, possiblke complicated by sinusitis and/or seasonal allergies; rx's sent for all; ok for d/c with precautions

## 2024-05-26 NOTE — ED PROVIDER NOTE - PHYSICAL EXAMINATION
Const: Awake, alert and oriented. In no acute distress. Well appearing.  HEENT: NC/AT. Moist mucous membranes. TMs clear; clear oropharynx  Eyes: No scleral icterus. EOMI.  Neck:. Soft and supple. Full ROM without pain.  Cardiac: Regular rate and rhythm. +S1/S2. No murmurs. Peripheral pulses 2+ and symmetric. No LE edema.  Resp: Speaking in full sentences. No evidence of respiratory distress. No wheezes, rales or rhonchi.  Abd: Soft, non-tender, non-distended. Normal bowel sounds in all 4 quadrants. No guarding or rebound.  Skin: No rashes, abrasions or lacerations.

## 2024-05-26 NOTE — ED ADULT NURSE NOTE - OBJECTIVE STATEMENT
Pt is alert and oriented to person, place, time and situation. Pt reports to ED for x1 week congestion with runny nose, denies fever or chills, reports that he has taken otc meds and hasn't helped. Pt breahting equally and unlabored on room air speaking in full sentences

## 2024-05-29 ENCOUNTER — NON-APPOINTMENT (OUTPATIENT)
Age: 29
End: 2024-05-29

## 2024-06-04 ENCOUNTER — EMERGENCY (EMERGENCY)
Facility: HOSPITAL | Age: 29
LOS: 1 days | Discharge: HOME | End: 2024-06-04

## 2024-06-04 VITALS
DIASTOLIC BLOOD PRESSURE: 80 MMHG | OXYGEN SATURATION: 97 % | TEMPERATURE: 98 F | RESPIRATION RATE: 18 BRPM | HEART RATE: 86 BPM | WEIGHT: 200.62 LBS | HEIGHT: 68 IN | SYSTOLIC BLOOD PRESSURE: 122 MMHG

## 2024-06-04 PROCEDURE — 99281 EMR DPT VST MAYX REQ PHY/QHP: CPT

## 2024-06-04 PROCEDURE — L9991: CPT

## 2024-06-04 NOTE — ED ADULT TRIAGE NOTE - CHIEF COMPLAINT QUOTE
pt states he was here for rt ear infection, on ABX, getting worse, unable to hear out of ear  A&Ox3, resp wnl

## 2025-05-15 NOTE — ED STATDOCS - PROGRESS NOTE DETAILS
Acute respiratory failure with hypoxia Acute respiratory failure with hypoxia PA NOTE: Pt seen by intake physician and orders/plan reviewed.agree with intake HPI AND PE.   PE: GEN: Awake, alert,  NAD,  Skin: Consistent color, well perfused. No lesions, cyanosis, masses, erythema, edema, rashes, petechiae.   CARDIAC: Reg rate and rhythm, S1,S2, RRR  RESP: No distress noted. Lungs CTA bilaterally no wheeze, ronchi, rales. ABD:+bsx4.  soft, supple, non-tender, no guarding. . NEURO: AOx3, no focal deficits   PLAN: pt is a 20yo male with abd pain, RUQ. pt currently asymptomatic without pain. PA NOTE: Pt seen by intake physician and orders/plan reviewed.agree with intake HPI AND PE.   PE: GEN: Awake, alert,  NAD,  Skin: Consistent color, well perfused. No lesions, cyanosis, masses, erythema, edema, rashes, petechiae.   CARDIAC: Reg rate and rhythm, S1,S2, RRR  RESP: No distress noted. Lungs CTA bilaterally no wheeze, ronchi, rales. ABD:+bsx4.  soft, supple, non-tender, no guarding. . NEURO: AOx3, no focal deficits   PLAN: pt is a 20yo male with abd pain, RUQ. pt currently asymptomatic without pain. US and labs reviewed. pt given copy of results. pt advised to follow up with PMD. pt verbalized understanding and agreement with plan and dx will dc.

## 2025-06-02 ENCOUNTER — NON-APPOINTMENT (OUTPATIENT)
Age: 30
End: 2025-06-02

## 2025-06-05 ENCOUNTER — NON-APPOINTMENT (OUTPATIENT)
Age: 30
End: 2025-06-05

## 2025-06-05 ENCOUNTER — OFFICE (OUTPATIENT)
Dept: URBAN - METROPOLITAN AREA CLINIC 6 | Facility: CLINIC | Age: 30
Setting detail: OPHTHALMOLOGY
End: 2025-06-05
Payer: COMMERCIAL

## 2025-06-05 DIAGNOSIS — H01.004: ICD-10-CM

## 2025-06-05 DIAGNOSIS — H01.001: ICD-10-CM

## 2025-06-05 PROBLEM — H52.7 REFRACTIVE ERROR: Status: ACTIVE | Noted: 2025-06-05

## 2025-06-05 PROCEDURE — 99202 OFFICE O/P NEW SF 15 MIN: CPT

## 2025-06-05 ASSESSMENT — CONFRONTATIONAL VISUAL FIELD TEST (CVF)
OS_FINDINGS: FULL
OD_FINDINGS: FULL

## 2025-06-05 ASSESSMENT — REFRACTION_AUTOREFRACTION
OS_CYLINDER: -0.25
OS_SPHERE: +0.25
OS_AXIS: 086
OD_CYLINDER: -0.75
OD_AXIS: 112
OD_SPHERE: +0.25

## 2025-06-05 ASSESSMENT — KERATOMETRY
OD_K2POWER_DIOPTERS: 42.25
OS_AXISANGLE_DEGREES: 107
OS_K2POWER_DIOPTERS: 2.25
OD_K1POWER_DIOPTERS: 41.50
OS_K1POWER_DIOPTERS: 42.00
OD_AXISANGLE_DEGREES: 056

## 2025-06-05 ASSESSMENT — LID EXAM ASSESSMENTS
OS_BLEPHARITIS: LUL T
OD_BLEPHARITIS: RUL T

## 2025-06-05 ASSESSMENT — CORNEAL TRAUMA - ABRASION: OS_ABRASION: ABSENT

## 2025-06-05 ASSESSMENT — TONOMETRY
OS_IOP_MMHG: 16
OD_IOP_MMHG: 17

## 2025-06-05 ASSESSMENT — VISUAL ACUITY
OD_BCVA: 20/20
OS_BCVA: 20/25